# Patient Record
Sex: FEMALE | Race: WHITE | NOT HISPANIC OR LATINO
[De-identification: names, ages, dates, MRNs, and addresses within clinical notes are randomized per-mention and may not be internally consistent; named-entity substitution may affect disease eponyms.]

---

## 2017-12-07 PROBLEM — Z00.00 ENCOUNTER FOR PREVENTIVE HEALTH EXAMINATION: Status: ACTIVE | Noted: 2017-12-07

## 2017-12-08 ENCOUNTER — APPOINTMENT (OUTPATIENT)
Dept: VASCULAR SURGERY | Facility: CLINIC | Age: 67
End: 2017-12-08
Payer: MEDICARE

## 2017-12-08 VITALS
SYSTOLIC BLOOD PRESSURE: 119 MMHG | HEART RATE: 85 BPM | HEIGHT: 60 IN | BODY MASS INDEX: 40.25 KG/M2 | TEMPERATURE: 97.8 F | OXYGEN SATURATION: 93 % | WEIGHT: 205 LBS | DIASTOLIC BLOOD PRESSURE: 84 MMHG

## 2017-12-08 DIAGNOSIS — Z78.9 OTHER SPECIFIED HEALTH STATUS: ICD-10-CM

## 2017-12-08 DIAGNOSIS — Z86.39 PERSONAL HISTORY OF OTHER ENDOCRINE, NUTRITIONAL AND METABOLIC DISEASE: ICD-10-CM

## 2017-12-08 DIAGNOSIS — Z83.511 FAMILY HISTORY OF GLAUCOMA: ICD-10-CM

## 2017-12-08 DIAGNOSIS — Z87.39 PERSONAL HISTORY OF OTHER DISEASES OF THE MUSCULOSKELETAL SYSTEM AND CONNECTIVE TISSUE: ICD-10-CM

## 2017-12-08 DIAGNOSIS — Z82.49 FAMILY HISTORY OF ISCHEMIC HEART DISEASE AND OTHER DISEASES OF THE CIRCULATORY SYSTEM: ICD-10-CM

## 2017-12-08 PROCEDURE — 29580 STRAPPING UNNA BOOT: CPT | Mod: 50

## 2017-12-08 PROCEDURE — 93970 EXTREMITY STUDY: CPT

## 2017-12-08 PROCEDURE — 99204 OFFICE O/P NEW MOD 45 MIN: CPT | Mod: 25

## 2017-12-11 PROBLEM — Z78.9 NON-SMOKER: Status: ACTIVE | Noted: 2017-12-08

## 2017-12-11 PROBLEM — Z82.49 FAMILY HISTORY OF VASCULAR DISORDER: Status: ACTIVE | Noted: 2017-12-08

## 2017-12-11 PROBLEM — Z86.39 HISTORY OF THYROID DISORDER: Status: RESOLVED | Noted: 2017-12-08 | Resolved: 2017-12-11

## 2017-12-11 PROBLEM — Z83.511 FAMILY HISTORY OF GLAUCOMA: Status: ACTIVE | Noted: 2017-12-08

## 2017-12-11 PROBLEM — Z87.39 HISTORY OF ARTHRITIS: Status: RESOLVED | Noted: 2017-12-08 | Resolved: 2017-12-11

## 2017-12-15 ENCOUNTER — APPOINTMENT (OUTPATIENT)
Dept: VASCULAR SURGERY | Facility: CLINIC | Age: 67
End: 2017-12-15
Payer: MEDICARE

## 2017-12-15 VITALS
TEMPERATURE: 98.3 F | WEIGHT: 205 LBS | RESPIRATION RATE: 15 BRPM | HEIGHT: 60 IN | HEART RATE: 77 BPM | DIASTOLIC BLOOD PRESSURE: 80 MMHG | OXYGEN SATURATION: 97 % | SYSTOLIC BLOOD PRESSURE: 121 MMHG | BODY MASS INDEX: 40.25 KG/M2

## 2017-12-15 PROCEDURE — 29580 STRAPPING UNNA BOOT: CPT | Mod: 50

## 2017-12-22 ENCOUNTER — APPOINTMENT (OUTPATIENT)
Dept: VASCULAR SURGERY | Facility: CLINIC | Age: 67
End: 2017-12-22
Payer: MEDICARE

## 2017-12-22 VITALS
HEIGHT: 60 IN | HEART RATE: 82 BPM | SYSTOLIC BLOOD PRESSURE: 118 MMHG | OXYGEN SATURATION: 97 % | RESPIRATION RATE: 15 BRPM | DIASTOLIC BLOOD PRESSURE: 79 MMHG | BODY MASS INDEX: 38.48 KG/M2 | WEIGHT: 196 LBS | TEMPERATURE: 97.9 F

## 2017-12-22 PROCEDURE — 99214 OFFICE O/P EST MOD 30 MIN: CPT | Mod: 25

## 2017-12-22 PROCEDURE — 29580 STRAPPING UNNA BOOT: CPT | Mod: LT

## 2018-01-09 ENCOUNTER — APPOINTMENT (OUTPATIENT)
Dept: VASCULAR SURGERY | Facility: CLINIC | Age: 68
End: 2018-01-09
Payer: MEDICARE

## 2018-01-09 ENCOUNTER — APPOINTMENT (OUTPATIENT)
Dept: CARDIOLOGY | Facility: CLINIC | Age: 68
End: 2018-01-09
Payer: MEDICARE

## 2018-01-09 VITALS
TEMPERATURE: 98.4 F | OXYGEN SATURATION: 96 % | DIASTOLIC BLOOD PRESSURE: 78 MMHG | HEIGHT: 60 IN | HEART RATE: 84 BPM | SYSTOLIC BLOOD PRESSURE: 121 MMHG | RESPIRATION RATE: 15 BRPM

## 2018-01-09 PROCEDURE — 99204 OFFICE O/P NEW MOD 45 MIN: CPT

## 2018-01-09 PROCEDURE — 99213 OFFICE O/P EST LOW 20 MIN: CPT

## 2018-01-09 PROCEDURE — 99214 OFFICE O/P EST MOD 30 MIN: CPT

## 2018-01-09 PROCEDURE — 93000 ELECTROCARDIOGRAM COMPLETE: CPT

## 2018-01-17 ENCOUNTER — MOBILE ON CALL (OUTPATIENT)
Age: 68
End: 2018-01-17

## 2018-02-06 ENCOUNTER — APPOINTMENT (OUTPATIENT)
Dept: VASCULAR SURGERY | Facility: CLINIC | Age: 68
End: 2018-02-06

## 2018-02-16 ENCOUNTER — APPOINTMENT (OUTPATIENT)
Dept: VASCULAR SURGERY | Facility: CLINIC | Age: 68
End: 2018-02-16

## 2018-04-06 ENCOUNTER — APPOINTMENT (OUTPATIENT)
Dept: VASCULAR SURGERY | Facility: CLINIC | Age: 68
End: 2018-04-06

## 2018-04-06 ENCOUNTER — APPOINTMENT (OUTPATIENT)
Dept: VASCULAR SURGERY | Facility: CLINIC | Age: 68
End: 2018-04-06
Payer: MEDICARE

## 2018-04-06 PROCEDURE — 36475 ENDOVENOUS RF 1ST VEIN: CPT | Mod: LT

## 2018-04-10 ENCOUNTER — APPOINTMENT (OUTPATIENT)
Dept: VASCULAR SURGERY | Facility: CLINIC | Age: 68
End: 2018-04-10
Payer: MEDICARE

## 2018-04-10 PROCEDURE — 93971 EXTREMITY STUDY: CPT

## 2018-04-24 ENCOUNTER — APPOINTMENT (OUTPATIENT)
Dept: VASCULAR SURGERY | Facility: CLINIC | Age: 68
End: 2018-04-24
Payer: MEDICARE

## 2018-04-24 VITALS
TEMPERATURE: 98.8 F | WEIGHT: 198 LBS | RESPIRATION RATE: 15 BRPM | BODY MASS INDEX: 38.87 KG/M2 | HEART RATE: 85 BPM | OXYGEN SATURATION: 98 % | SYSTOLIC BLOOD PRESSURE: 117 MMHG | DIASTOLIC BLOOD PRESSURE: 76 MMHG | HEIGHT: 60 IN

## 2018-04-24 PROCEDURE — 99214 OFFICE O/P EST MOD 30 MIN: CPT

## 2018-05-07 ENCOUNTER — RECORD ABSTRACTING (OUTPATIENT)
Age: 68
End: 2018-05-07

## 2018-05-07 DIAGNOSIS — Z82.49 FAMILY HISTORY OF ISCHEMIC HEART DISEASE AND OTHER DISEASES OF THE CIRCULATORY SYSTEM: ICD-10-CM

## 2018-05-08 ENCOUNTER — APPOINTMENT (OUTPATIENT)
Dept: CARDIOLOGY | Facility: CLINIC | Age: 68
End: 2018-05-08
Payer: MEDICARE

## 2018-05-08 VITALS
BODY MASS INDEX: 38.28 KG/M2 | SYSTOLIC BLOOD PRESSURE: 120 MMHG | RESPIRATION RATE: 16 BRPM | HEART RATE: 68 BPM | DIASTOLIC BLOOD PRESSURE: 72 MMHG | WEIGHT: 195 LBS | HEIGHT: 60 IN

## 2018-05-08 PROCEDURE — 93000 ELECTROCARDIOGRAM COMPLETE: CPT

## 2018-05-08 PROCEDURE — 99214 OFFICE O/P EST MOD 30 MIN: CPT

## 2018-05-08 RX ORDER — CETIRIZINE HYDROCHLORIDE 10 MG/1
10 CAPSULE, LIQUID FILLED ORAL
Refills: 0 | Status: ACTIVE | COMMUNITY

## 2018-05-08 RX ORDER — ATORVASTATIN CALCIUM 10 MG/1
10 TABLET, FILM COATED ORAL
Qty: 90 | Refills: 0 | Status: ACTIVE | COMMUNITY
Start: 2017-06-12

## 2018-05-08 RX ORDER — CHOLECALCIFEROL (VITAMIN D3) 50 MCG
50 MCG TABLET ORAL
Refills: 0 | Status: ACTIVE | COMMUNITY

## 2018-05-08 RX ORDER — CLOBETASOL PROPIONATE 0.05 G/100ML
0.05 LOTION TOPICAL TWICE DAILY
Qty: 60 | Refills: 3 | Status: DISCONTINUED | COMMUNITY
Start: 2017-12-15 | End: 2018-05-08

## 2018-05-08 RX ORDER — METFORMIN ER 500 MG 500 MG/1
500 TABLET ORAL
Refills: 0 | Status: ACTIVE | COMMUNITY
Start: 2017-06-12

## 2018-08-07 ENCOUNTER — APPOINTMENT (OUTPATIENT)
Dept: VASCULAR SURGERY | Facility: CLINIC | Age: 68
End: 2018-08-07
Payer: MEDICARE

## 2018-08-07 VITALS
OXYGEN SATURATION: 97 % | SYSTOLIC BLOOD PRESSURE: 115 MMHG | DIASTOLIC BLOOD PRESSURE: 74 MMHG | BODY MASS INDEX: 39.26 KG/M2 | WEIGHT: 201 LBS | HEART RATE: 77 BPM | TEMPERATURE: 98.5 F

## 2018-08-07 DIAGNOSIS — Z98.890 OTHER SPECIFIED POSTPROCEDURAL STATES: ICD-10-CM

## 2018-08-07 DIAGNOSIS — I83.12 VARICOSE VEINS OF LEFT LOWER EXTREMITY WITH INFLAMMATION: ICD-10-CM

## 2018-08-07 PROCEDURE — 99213 OFFICE O/P EST LOW 20 MIN: CPT

## 2018-08-08 PROBLEM — Z98.890 STATUS POST ENDOVENOUS RADIOFREQUENCY ABLATION (RFA) OF SAPHENOUS VEIN: Status: ACTIVE | Noted: 2018-04-24

## 2018-08-08 PROBLEM — I83.12 VARICOSE VEINS OF LEFT LOWER EXTREMITY WITH INFLAMMATION: Status: ACTIVE | Noted: 2017-12-11

## 2018-09-04 ENCOUNTER — RESULT REVIEW (OUTPATIENT)
Age: 68
End: 2018-09-04

## 2018-09-10 ENCOUNTER — APPOINTMENT (OUTPATIENT)
Dept: SURGERY | Facility: CLINIC | Age: 68
End: 2018-09-10
Payer: MEDICARE

## 2018-09-10 PROCEDURE — 99204K: CUSTOM

## 2018-09-17 ENCOUNTER — APPOINTMENT (OUTPATIENT)
Dept: ULTRASOUND IMAGING | Facility: IMAGING CENTER | Age: 68
End: 2018-09-17
Payer: MEDICARE

## 2018-09-17 ENCOUNTER — OUTPATIENT (OUTPATIENT)
Dept: OUTPATIENT SERVICES | Facility: HOSPITAL | Age: 68
LOS: 1 days | End: 2018-09-17
Payer: MEDICARE

## 2018-09-17 VITALS
RESPIRATION RATE: 16 BRPM | TEMPERATURE: 98 F | DIASTOLIC BLOOD PRESSURE: 70 MMHG | WEIGHT: 199.96 LBS | HEIGHT: 60 IN | HEART RATE: 66 BPM | SYSTOLIC BLOOD PRESSURE: 120 MMHG

## 2018-09-17 DIAGNOSIS — H26.9 UNSPECIFIED CATARACT: Chronic | ICD-10-CM

## 2018-09-17 DIAGNOSIS — E83.52 HYPERCALCEMIA: Chronic | ICD-10-CM

## 2018-09-17 DIAGNOSIS — R92.8 OTHER ABNORMAL AND INCONCLUSIVE FINDINGS ON DIAGNOSTIC IMAGING OF BREAST: ICD-10-CM

## 2018-09-17 DIAGNOSIS — N63.20 UNSPECIFIED LUMP IN THE LEFT BREAST, UNSPECIFIED QUADRANT: Chronic | ICD-10-CM

## 2018-09-17 DIAGNOSIS — N63.10 UNSPECIFIED LUMP IN THE RIGHT BREAST, UNSPECIFIED QUADRANT: ICD-10-CM

## 2018-09-17 DIAGNOSIS — R06.83 SNORING: ICD-10-CM

## 2018-09-17 DIAGNOSIS — M12.9 ARTHROPATHY, UNSPECIFIED: Chronic | ICD-10-CM

## 2018-09-17 LAB
ALBUMIN SERPL ELPH-MCNC: 4.6 G/DL — SIGNIFICANT CHANGE UP (ref 3.3–5)
ALP SERPL-CCNC: 53 U/L — SIGNIFICANT CHANGE UP (ref 40–120)
ALT FLD-CCNC: 19 U/L — SIGNIFICANT CHANGE UP (ref 4–33)
AST SERPL-CCNC: 14 U/L — SIGNIFICANT CHANGE UP (ref 4–32)
BILIRUB SERPL-MCNC: < 0.2 MG/DL — LOW (ref 0.2–1.2)
BUN SERPL-MCNC: 27 MG/DL — HIGH (ref 7–23)
CALCIUM SERPL-MCNC: 9.8 MG/DL — SIGNIFICANT CHANGE UP (ref 8.4–10.5)
CHLORIDE SERPL-SCNC: 100 MMOL/L — SIGNIFICANT CHANGE UP (ref 98–107)
CO2 SERPL-SCNC: 24 MMOL/L — SIGNIFICANT CHANGE UP (ref 22–31)
CREAT SERPL-MCNC: 1.08 MG/DL — SIGNIFICANT CHANGE UP (ref 0.5–1.3)
GLUCOSE SERPL-MCNC: 108 MG/DL — HIGH (ref 70–99)
HBA1C BLD-MCNC: 6.8 % — HIGH (ref 4–5.6)
HCT VFR BLD CALC: 39.1 % — SIGNIFICANT CHANGE UP (ref 34.5–45)
HGB BLD-MCNC: 12.8 G/DL — SIGNIFICANT CHANGE UP (ref 11.5–15.5)
MCHC RBC-ENTMCNC: 31.1 PG — SIGNIFICANT CHANGE UP (ref 27–34)
MCHC RBC-ENTMCNC: 32.7 % — SIGNIFICANT CHANGE UP (ref 32–36)
MCV RBC AUTO: 94.9 FL — SIGNIFICANT CHANGE UP (ref 80–100)
NRBC # FLD: 0 — SIGNIFICANT CHANGE UP
PLATELET # BLD AUTO: 212 K/UL — SIGNIFICANT CHANGE UP (ref 150–400)
PMV BLD: 11.4 FL — SIGNIFICANT CHANGE UP (ref 7–13)
POTASSIUM SERPL-MCNC: 5.7 MMOL/L — HIGH (ref 3.5–5.3)
POTASSIUM SERPL-SCNC: 5.7 MMOL/L — HIGH (ref 3.5–5.3)
PROT SERPL-MCNC: 7.3 G/DL — SIGNIFICANT CHANGE UP (ref 6–8.3)
RBC # BLD: 4.12 M/UL — SIGNIFICANT CHANGE UP (ref 3.8–5.2)
RBC # FLD: 11.8 % — SIGNIFICANT CHANGE UP (ref 10.3–14.5)
SODIUM SERPL-SCNC: 137 MMOL/L — SIGNIFICANT CHANGE UP (ref 135–145)
WBC # BLD: 10.47 K/UL — SIGNIFICANT CHANGE UP (ref 3.8–10.5)
WBC # FLD AUTO: 10.47 K/UL — SIGNIFICANT CHANGE UP (ref 3.8–10.5)

## 2018-09-17 PROCEDURE — 19285 PERQ DEV BREAST 1ST US IMAG: CPT | Mod: RT

## 2018-09-17 PROCEDURE — C1739: CPT

## 2018-09-17 PROCEDURE — 19285 PERQ DEV BREAST 1ST US IMAG: CPT

## 2018-09-17 PROCEDURE — 93010 ELECTROCARDIOGRAM REPORT: CPT

## 2018-09-17 RX ORDER — SODIUM CHLORIDE 9 MG/ML
1000 INJECTION, SOLUTION INTRAVENOUS
Qty: 0 | Refills: 0 | Status: DISCONTINUED | OUTPATIENT
Start: 2018-09-25 | End: 2018-10-10

## 2018-09-17 NOTE — H&P PST ADULT - ENDOCRINE COMMENTS
diabetes diagnosed in 2013; denies thyroid disease diabetes diagnosed in 2013, doesn't do finger sticks; denies thyroid disease

## 2018-09-17 NOTE — H&P PST ADULT - LYMPHATIC
anterior cervical R/posterior cervical L/supraclavicular L/anterior cervical L/supraclavicular R/posterior cervical R

## 2018-09-17 NOTE — H&P PST ADULT - PSH
Arthropathy  right knee replacement in 2014  Breast mass, left  10 years ago in 2009  Cataract  b/l cataract lenses  Hypercalcemia  parathyroidectomy in 2008

## 2018-09-17 NOTE — H&P PST ADULT - PMH
Diabetes  type II, diagnosed in 2013  Hypercholesterolemia    Hypertension    Lymphedema  b/l leg lymphedema  Obesity    Snoring  NAY precautions -- responds affirmatively to STOP BANG questionnaire

## 2018-09-17 NOTE — H&P PST ADULT - HISTORY OF PRESENT ILLNESS
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 Subsequent mammo and biopsy revealed pathology. Scheduled for right breast biopsy with seed localization on 9-25-18

## 2018-09-17 NOTE — H&P PST ADULT - PROBLEM SELECTOR PLAN 1
This is a 67 y/o female who is scheduled for right breast biopsy with seed localization on 9-25-18 This is a 67 y/o female who is scheduled for right breast biopsy with seed localization on 9-25-18  * Last aspirin on 9-16-18  * Instructed to take normal am dose of lisinopril, chlorthalidone, zyrtec the am of surgery

## 2018-09-17 NOTE — H&P PST ADULT - NSANTHOSAYNRD_GEN_A_CORE
No. ANY screening performed.  STOP BANG Legend: 0-2 = LOW Risk; 3-4 = INTERMEDIATE Risk; 5-8 = HIGH Risk

## 2018-09-18 ENCOUNTER — APPOINTMENT (OUTPATIENT)
Dept: CARDIOLOGY | Facility: CLINIC | Age: 68
End: 2018-09-18
Payer: MEDICARE

## 2018-09-18 VITALS
SYSTOLIC BLOOD PRESSURE: 115 MMHG | OXYGEN SATURATION: 96 % | RESPIRATION RATE: 16 BRPM | HEART RATE: 76 BPM | HEIGHT: 60 IN | BODY MASS INDEX: 39.46 KG/M2 | WEIGHT: 201 LBS | DIASTOLIC BLOOD PRESSURE: 76 MMHG

## 2018-09-18 PROCEDURE — 93000 ELECTROCARDIOGRAM COMPLETE: CPT

## 2018-09-18 PROCEDURE — 99213 OFFICE O/P EST LOW 20 MIN: CPT

## 2018-09-18 RX ORDER — OMEPRAZOLE 20 MG/1
20 CAPSULE, DELAYED RELEASE ORAL DAILY
Refills: 0 | Status: DISCONTINUED | COMMUNITY
Start: 2016-12-12 | End: 2018-09-18

## 2018-09-20 PROBLEM — E66.9 OBESITY, UNSPECIFIED: Chronic | Status: ACTIVE | Noted: 2018-09-17

## 2018-09-20 PROBLEM — R06.83 SNORING: Chronic | Status: ACTIVE | Noted: 2018-09-17

## 2018-09-20 PROBLEM — I89.0 LYMPHEDEMA, NOT ELSEWHERE CLASSIFIED: Chronic | Status: ACTIVE | Noted: 2018-09-17

## 2018-09-20 PROBLEM — E78.00 PURE HYPERCHOLESTEROLEMIA, UNSPECIFIED: Chronic | Status: ACTIVE | Noted: 2018-09-17

## 2018-09-20 PROBLEM — E11.9 TYPE 2 DIABETES MELLITUS WITHOUT COMPLICATIONS: Chronic | Status: ACTIVE | Noted: 2018-09-17

## 2018-09-20 PROBLEM — I10 ESSENTIAL (PRIMARY) HYPERTENSION: Chronic | Status: ACTIVE | Noted: 2018-09-17

## 2018-09-25 ENCOUNTER — OUTPATIENT (OUTPATIENT)
Dept: OUTPATIENT SERVICES | Facility: HOSPITAL | Age: 68
LOS: 1 days | Discharge: HOME | End: 2018-09-25
Payer: MEDICARE

## 2018-09-25 ENCOUNTER — RESULT REVIEW (OUTPATIENT)
Age: 68
End: 2018-09-25

## 2018-09-25 ENCOUNTER — APPOINTMENT (OUTPATIENT)
Dept: SURGERY | Facility: HOSPITAL | Age: 68
End: 2018-09-25

## 2018-09-25 VITALS
SYSTOLIC BLOOD PRESSURE: 98 MMHG | OXYGEN SATURATION: 97 % | HEART RATE: 58 BPM | RESPIRATION RATE: 16 BRPM | DIASTOLIC BLOOD PRESSURE: 50 MMHG

## 2018-09-25 VITALS
RESPIRATION RATE: 16 BRPM | SYSTOLIC BLOOD PRESSURE: 122 MMHG | HEIGHT: 60 IN | TEMPERATURE: 98 F | HEART RATE: 74 BPM | OXYGEN SATURATION: 97 % | WEIGHT: 199.96 LBS | DIASTOLIC BLOOD PRESSURE: 58 MMHG

## 2018-09-25 DIAGNOSIS — E83.52 HYPERCALCEMIA: Chronic | ICD-10-CM

## 2018-09-25 DIAGNOSIS — M12.9 ARTHROPATHY, UNSPECIFIED: Chronic | ICD-10-CM

## 2018-09-25 DIAGNOSIS — N63.20 UNSPECIFIED LUMP IN THE LEFT BREAST, UNSPECIFIED QUADRANT: Chronic | ICD-10-CM

## 2018-09-25 DIAGNOSIS — R92.8 OTHER ABNORMAL AND INCONCLUSIVE FINDINGS ON DIAGNOSTIC IMAGING OF BREAST: ICD-10-CM

## 2018-09-25 DIAGNOSIS — H26.9 UNSPECIFIED CATARACT: Chronic | ICD-10-CM

## 2018-09-25 LAB
GLUCOSE BLDV-MCNC: 136 — HIGH (ref 70–99)
POTASSIUM BLDV-SCNC: 4.5 MMOL/L — SIGNIFICANT CHANGE UP (ref 3.4–4.5)

## 2018-09-25 PROCEDURE — 19125K: CUSTOM | Mod: RT

## 2018-09-25 PROCEDURE — 76098 X-RAY EXAM SURGICAL SPECIMEN: CPT | Mod: 26

## 2018-09-25 PROCEDURE — 88305 TISSUE EXAM BY PATHOLOGIST: CPT | Mod: 26

## 2018-09-25 RX ORDER — CETIRIZINE HYDROCHLORIDE 10 MG/1
1 TABLET ORAL
Qty: 0 | Refills: 0 | COMMUNITY

## 2018-09-25 RX ORDER — CHOLECALCIFEROL (VITAMIN D3) 125 MCG
1 CAPSULE ORAL
Qty: 0 | Refills: 0 | COMMUNITY

## 2018-09-25 RX ORDER — ATORVASTATIN CALCIUM 80 MG/1
1 TABLET, FILM COATED ORAL
Qty: 0 | Refills: 0 | COMMUNITY

## 2018-09-25 RX ORDER — ACETAMINOPHEN 500 MG
2 TABLET ORAL
Qty: 0 | Refills: 0 | COMMUNITY

## 2018-09-25 RX ORDER — ASPIRIN/CALCIUM CARB/MAGNESIUM 324 MG
1 TABLET ORAL
Qty: 0 | Refills: 0 | COMMUNITY

## 2018-09-25 RX ORDER — CHLORTHALIDONE 50 MG
1 TABLET ORAL
Qty: 0 | Refills: 0 | COMMUNITY

## 2018-09-25 RX ORDER — METFORMIN HYDROCHLORIDE 850 MG/1
1 TABLET ORAL
Qty: 0 | Refills: 0 | COMMUNITY

## 2018-09-25 RX ORDER — LISINOPRIL 2.5 MG/1
1 TABLET ORAL
Qty: 0 | Refills: 0 | COMMUNITY

## 2018-09-25 NOTE — ASU DISCHARGE PLAN (ADULT/PEDIATRIC). - NURSING INSTRUCTIONS
No heavy lifting or straining. Keep right breast dressing D & I. Dressing may be removed in 24 hours. Follow Dr Mack's discharge instruction sheet.

## 2018-09-25 NOTE — ASU DISCHARGE PLAN (ADULT/PEDIATRIC). - MEDICATION SUMMARY - MEDICATIONS TO TAKE
I will START or STAY ON the medications listed below when I get home from the hospital:    Tylenol 325 mg oral tablet  -- 2 tab(s) by mouth every 4 hours, As Needed for post op pain   -- Indication: For post op pain     aspirin 81 mg oral tablet  -- 1 tab(s) by mouth once a day  -- Indication: For home medication     lisinopril 40 mg oral tablet  -- 1 tab(s) by mouth once a day  -- Indication: For HTN    metFORMIN 500 mg oral tablet  -- 1 tab(s) by mouth 2 times a day  -- Indication: For DM    ZyrTEC 10 mg oral tablet  -- 1 tab(s) by mouth once a day (at bedtime)  -- Indication: For allergies    atorvastatin 10 mg oral tablet  -- 1 tab(s) by mouth once a day (at bedtime)  -- Indication: For HLD    chlorthalidone 25 mg oral tablet  -- 1 tab(s) by mouth once a day  -- Indication: For HTN    Vitamin D3 2000 intl units oral tablet  -- 1 tab(s) by mouth once a day  -- Indication: For supplement

## 2018-09-25 NOTE — ASU DISCHARGE PLAN (ADULT/PEDIATRIC). - CONDITIONS AT DISCHARGE
The PCP placed an order for an Open Access Colonoscopy Screening. This is a recall, last performed on  5/16/08  by Dr. Hogue   Please contact the patient for scheduling per your recall protocol.          stable stable No heavy lifting or straining. If any bleeding, swelling or redness notify MD. Pt mp PO offering no c/o pain. Right breast dressing D & >

## 2018-09-27 LAB — SURGICAL PATHOLOGY STUDY: SIGNIFICANT CHANGE UP

## 2018-10-08 ENCOUNTER — APPOINTMENT (OUTPATIENT)
Dept: SURGERY | Facility: CLINIC | Age: 68
End: 2018-10-08
Payer: MEDICARE

## 2018-10-08 PROCEDURE — 99024 POSTOP FOLLOW-UP VISIT: CPT

## 2019-03-11 ENCOUNTER — APPOINTMENT (OUTPATIENT)
Dept: CARDIOLOGY | Facility: CLINIC | Age: 69
End: 2019-03-11
Payer: MEDICARE

## 2019-03-11 ENCOUNTER — NON-APPOINTMENT (OUTPATIENT)
Age: 69
End: 2019-03-11

## 2019-03-11 VITALS
DIASTOLIC BLOOD PRESSURE: 76 MMHG | BODY MASS INDEX: 38.68 KG/M2 | OXYGEN SATURATION: 97 % | HEART RATE: 72 BPM | HEIGHT: 60 IN | SYSTOLIC BLOOD PRESSURE: 123 MMHG | WEIGHT: 197 LBS | RESPIRATION RATE: 15 BRPM

## 2019-03-11 PROCEDURE — 99214 OFFICE O/P EST MOD 30 MIN: CPT

## 2019-03-11 PROCEDURE — 93000 ELECTROCARDIOGRAM COMPLETE: CPT

## 2019-03-11 NOTE — REVIEW OF SYSTEMS
[FreeTextEntry1] : Patient denies headache. Other than noted above full review of systems is unremarkable.

## 2019-03-11 NOTE — HISTORY OF PRESENT ILLNESS
[FreeTextEntry1] : Patient returns to office today feeling generally well and about the same as last I saw her. Her edema remains controlled with the use of stockings. She reports blood pressures at home in the 110s over 70s. Unfortunately he has not lost significant weight although she is trying to make some changes and lost a few pounds. Patient denies chest pain, shortness of breath, palpitations, orthopnea, presyncope, syncope.

## 2019-03-11 NOTE — PHYSICAL EXAM
[General Appearance - In No Acute Distress] : no acute distress [Normal Conjunctiva] : the conjunctiva exhibited no abnormalities [Normal Oral Mucosa] : normal oral mucosa [Auscultation Breath Sounds / Voice Sounds] : lungs were clear to auscultation bilaterally [Heart Sounds] : normal S1 and S2 [Abdomen Soft] : soft [Abdomen Tenderness] : non-tender [Abnormal Walk] : normal gait [Nail Clubbing] : no clubbing of the fingernails [Cyanosis, Localized] : no localized cyanosis [Skin Color & Pigmentation] : normal skin color and pigmentation [Oriented To Time, Place, And Person] : oriented to person, place, and time [Affect] : the affect was normal [FreeTextEntry1] : B/l lymphedema improved

## 2019-03-11 NOTE — DISCUSSION/SUMMARY
[FreeTextEntry1] : 1. Continue current cardiac meds in doses as noted above for hypertension.\par 2. Follow up with primary doctor for treatment of diabetes.\par 3. Continue use of stockings and leg edema pump for her edema. Followup with vascular.\par 4. Patient encouraged to work on diet to lose weight.\par 5. Patient is encouraged to exercise at least 30 minutes a day everyday of the week.\par 6. Follow up here in six months.

## 2019-03-11 NOTE — ASSESSMENT
[FreeTextEntry1] : EKG: Sinus rhythm with no significant ST or T wave changes.\par \par 68-year-old female with a past medical history of hypertension, diabetes, chronic lymphedema, obesity who presents for followup. Patient continues to do well from a cardiovascular standpoint with good control of her blood pressure and her edema. Unfortunately she still has a lot of issues with weight and has not lost any significant weight but is continuing to make attempts to change this fact. Certainly if she can start exercising that would help but she needs to get on a more strict dietary plan.

## 2019-08-26 ENCOUNTER — APPOINTMENT (OUTPATIENT)
Dept: CARDIOLOGY | Facility: CLINIC | Age: 69
End: 2019-08-26

## 2019-12-17 ENCOUNTER — OUTPATIENT (OUTPATIENT)
Dept: OUTPATIENT SERVICES | Facility: HOSPITAL | Age: 69
LOS: 1 days | End: 2019-12-17

## 2019-12-17 DIAGNOSIS — H26.9 UNSPECIFIED CATARACT: Chronic | ICD-10-CM

## 2019-12-17 DIAGNOSIS — N63.20 UNSPECIFIED LUMP IN THE LEFT BREAST, UNSPECIFIED QUADRANT: Chronic | ICD-10-CM

## 2019-12-17 DIAGNOSIS — E83.52 HYPERCALCEMIA: Chronic | ICD-10-CM

## 2019-12-17 DIAGNOSIS — M12.9 ARTHROPATHY, UNSPECIFIED: Chronic | ICD-10-CM

## 2019-12-26 ENCOUNTER — APPOINTMENT (OUTPATIENT)
Dept: CARDIOLOGY | Facility: CLINIC | Age: 69
End: 2019-12-26
Payer: MEDICARE

## 2019-12-26 VITALS
DIASTOLIC BLOOD PRESSURE: 73 MMHG | HEART RATE: 81 BPM | WEIGHT: 192 LBS | RESPIRATION RATE: 15 BRPM | BODY MASS INDEX: 37.69 KG/M2 | OXYGEN SATURATION: 95 % | SYSTOLIC BLOOD PRESSURE: 126 MMHG | HEIGHT: 60 IN

## 2019-12-26 PROCEDURE — 99214 OFFICE O/P EST MOD 30 MIN: CPT

## 2019-12-26 RX ORDER — MONTELUKAST 10 MG/1
10 TABLET, FILM COATED ORAL
Refills: 0 | Status: ACTIVE | COMMUNITY

## 2019-12-26 NOTE — PHYSICAL EXAM
[General Appearance - In No Acute Distress] : no acute distress [Normal Conjunctiva] : the conjunctiva exhibited no abnormalities [Normal Oral Mucosa] : normal oral mucosa [Auscultation Breath Sounds / Voice Sounds] : lungs were clear to auscultation bilaterally [Heart Sounds] : normal S1 and S2 [Abdomen Soft] : soft [Abdomen Tenderness] : non-tender [Abnormal Walk] : normal gait [Nail Clubbing] : no clubbing of the fingernails [Cyanosis, Localized] : no localized cyanosis [Skin Color & Pigmentation] : normal skin color and pigmentation [Oriented To Time, Place, And Person] : oriented to person, place, and time [Affect] : the affect was normal [FreeTextEntry1] : B/l lymphedema R>L

## 2019-12-26 NOTE — DISCUSSION/SUMMARY
[FreeTextEntry1] : 1. Proceed with surgery as planned.\par 2. Monitor through the procedure until stable post procedurally.\par 3. Continue current cardiac meds in doses as noted above for hypertension.\par 4. Follow up with primary doctor for treatment of diabetes.\par 5. Continue use of stockings and leg edema pump for her edema. Followup with vascular.\par 6. Patient encouraged to work on diet to lose weight.\par 7. Patient is encouraged to exercise at least 30 minutes a day everyday of the week.\par 8. No additional cardiac testing at this time. Echocardiogram prior to followup.\par 9. Follow up here in six months.

## 2019-12-26 NOTE — HISTORY OF PRESENT ILLNESS
[FreeTextEntry1] : Patient returns to office today planning a left knee replacement. She had a right knee replacement without issue several years ago but the left knee now needs to be done. She otherwise has been feeling very well. 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 Her edema has been very stable. She reports no shortness of breath at any time.  Patient denies chest pain, palpitations, orthopnea, presyncope, syncope.

## 2019-12-26 NOTE — ASSESSMENT
[FreeTextEntry1] : EKG: Sinus rhythm with no significant ST or T wave changes at presurgical testing December 17, 2019.\par \par 69-year-old female with a past medical history of hypertension, diabetes, chronic lymphedema, obesity who presents for followup. Patient is planning left knee replacement in a few weeks. She has been stable from a cardiac standpoint for some time. Her edema remains controlled with stockings. She does well above 4 METs regularly despite her knee pains without issue. EKG is unremarkable. Blood pressure is controlled. She represents a low risk with no contraindication to surgery at this time.

## 2020-01-09 ENCOUNTER — INPATIENT (INPATIENT)
Facility: HOSPITAL | Age: 70
LOS: 1 days | Discharge: NURSING, REHAB OR LONG-TERM CARE | End: 2020-01-11
Payer: MEDICARE

## 2020-01-09 ENCOUNTER — OUTPATIENT (OUTPATIENT)
Dept: OUTPATIENT SERVICES | Facility: HOSPITAL | Age: 70
LOS: 1 days | End: 2020-01-09

## 2020-01-09 DIAGNOSIS — H26.9 UNSPECIFIED CATARACT: Chronic | ICD-10-CM

## 2020-01-09 DIAGNOSIS — M12.9 ARTHROPATHY, UNSPECIFIED: Chronic | ICD-10-CM

## 2020-01-09 DIAGNOSIS — N63.20 UNSPECIFIED LUMP IN THE LEFT BREAST, UNSPECIFIED QUADRANT: Chronic | ICD-10-CM

## 2020-01-09 DIAGNOSIS — E83.52 HYPERCALCEMIA: Chronic | ICD-10-CM

## 2020-01-09 PROCEDURE — 73560 X-RAY EXAM OF KNEE 1 OR 2: CPT | Mod: 26,LT

## 2020-01-10 ENCOUNTER — OUTPATIENT (OUTPATIENT)
Dept: OUTPATIENT SERVICES | Facility: HOSPITAL | Age: 70
LOS: 1 days | End: 2020-01-10

## 2020-01-10 DIAGNOSIS — E83.52 HYPERCALCEMIA: Chronic | ICD-10-CM

## 2020-01-10 DIAGNOSIS — H26.9 UNSPECIFIED CATARACT: Chronic | ICD-10-CM

## 2020-01-10 DIAGNOSIS — N63.20 UNSPECIFIED LUMP IN THE LEFT BREAST, UNSPECIFIED QUADRANT: Chronic | ICD-10-CM

## 2020-01-10 DIAGNOSIS — M12.9 ARTHROPATHY, UNSPECIFIED: Chronic | ICD-10-CM

## 2020-01-10 PROCEDURE — 76770 US EXAM ABDO BACK WALL COMP: CPT | Mod: 26

## 2020-01-10 PROCEDURE — 76856 US EXAM PELVIC COMPLETE: CPT | Mod: 26

## 2020-01-11 ENCOUNTER — OUTPATIENT (OUTPATIENT)
Dept: OUTPATIENT SERVICES | Facility: HOSPITAL | Age: 70
LOS: 1 days | End: 2020-01-11

## 2020-01-11 ENCOUNTER — INPATIENT (INPATIENT)
Facility: HOSPITAL | Age: 70
LOS: 8 days | Discharge: HOME | End: 2020-01-20
Attending: INTERNAL MEDICINE | Admitting: INTERNAL MEDICINE

## 2020-01-11 DIAGNOSIS — N63.20 UNSPECIFIED LUMP IN THE LEFT BREAST, UNSPECIFIED QUADRANT: Chronic | ICD-10-CM

## 2020-01-11 DIAGNOSIS — H26.9 UNSPECIFIED CATARACT: Chronic | ICD-10-CM

## 2020-01-11 DIAGNOSIS — M12.9 ARTHROPATHY, UNSPECIFIED: Chronic | ICD-10-CM

## 2020-01-11 DIAGNOSIS — E83.52 HYPERCALCEMIA: Chronic | ICD-10-CM

## 2020-01-12 ENCOUNTER — OUTPATIENT (OUTPATIENT)
Dept: OUTPATIENT SERVICES | Facility: HOSPITAL | Age: 70
LOS: 1 days | End: 2020-01-12

## 2020-01-12 DIAGNOSIS — H26.9 UNSPECIFIED CATARACT: Chronic | ICD-10-CM

## 2020-01-12 DIAGNOSIS — N63.20 UNSPECIFIED LUMP IN THE LEFT BREAST, UNSPECIFIED QUADRANT: Chronic | ICD-10-CM

## 2020-01-12 DIAGNOSIS — M12.9 ARTHROPATHY, UNSPECIFIED: Chronic | ICD-10-CM

## 2020-01-12 DIAGNOSIS — E83.52 HYPERCALCEMIA: Chronic | ICD-10-CM

## 2020-01-13 ENCOUNTER — OUTPATIENT (OUTPATIENT)
Dept: OUTPATIENT SERVICES | Facility: HOSPITAL | Age: 70
LOS: 1 days | End: 2020-01-13

## 2020-01-13 DIAGNOSIS — E83.52 HYPERCALCEMIA: Chronic | ICD-10-CM

## 2020-01-13 DIAGNOSIS — N63.20 UNSPECIFIED LUMP IN THE LEFT BREAST, UNSPECIFIED QUADRANT: Chronic | ICD-10-CM

## 2020-01-13 DIAGNOSIS — M12.9 ARTHROPATHY, UNSPECIFIED: Chronic | ICD-10-CM

## 2020-01-13 DIAGNOSIS — H26.9 UNSPECIFIED CATARACT: Chronic | ICD-10-CM

## 2020-01-16 ENCOUNTER — OUTPATIENT (OUTPATIENT)
Dept: OUTPATIENT SERVICES | Facility: HOSPITAL | Age: 70
LOS: 1 days | End: 2020-01-16

## 2020-01-16 DIAGNOSIS — H26.9 UNSPECIFIED CATARACT: Chronic | ICD-10-CM

## 2020-01-16 DIAGNOSIS — M12.9 ARTHROPATHY, UNSPECIFIED: Chronic | ICD-10-CM

## 2020-01-16 DIAGNOSIS — E83.52 HYPERCALCEMIA: Chronic | ICD-10-CM

## 2020-01-16 DIAGNOSIS — N63.20 UNSPECIFIED LUMP IN THE LEFT BREAST, UNSPECIFIED QUADRANT: Chronic | ICD-10-CM

## 2020-01-18 ENCOUNTER — OUTPATIENT (OUTPATIENT)
Dept: OUTPATIENT SERVICES | Facility: HOSPITAL | Age: 70
LOS: 1 days | End: 2020-01-18

## 2020-01-18 DIAGNOSIS — H26.9 UNSPECIFIED CATARACT: Chronic | ICD-10-CM

## 2020-01-18 DIAGNOSIS — N63.20 UNSPECIFIED LUMP IN THE LEFT BREAST, UNSPECIFIED QUADRANT: Chronic | ICD-10-CM

## 2020-01-18 DIAGNOSIS — E83.52 HYPERCALCEMIA: Chronic | ICD-10-CM

## 2020-01-18 DIAGNOSIS — M12.9 ARTHROPATHY, UNSPECIFIED: Chronic | ICD-10-CM

## 2020-08-04 ENCOUNTER — APPOINTMENT (OUTPATIENT)
Dept: CARDIOLOGY | Facility: CLINIC | Age: 70
End: 2020-08-04
Payer: MEDICARE

## 2020-08-04 PROCEDURE — 93306 TTE W/DOPPLER COMPLETE: CPT

## 2020-08-10 ENCOUNTER — APPOINTMENT (OUTPATIENT)
Dept: CARDIOLOGY | Facility: CLINIC | Age: 70
End: 2020-08-10
Payer: MEDICARE

## 2020-08-10 ENCOUNTER — NON-APPOINTMENT (OUTPATIENT)
Age: 70
End: 2020-08-10

## 2020-08-10 VITALS
TEMPERATURE: 98.2 F | HEART RATE: 81 BPM | OXYGEN SATURATION: 95 % | WEIGHT: 192 LBS | HEIGHT: 60 IN | SYSTOLIC BLOOD PRESSURE: 123 MMHG | RESPIRATION RATE: 16 BRPM | DIASTOLIC BLOOD PRESSURE: 73 MMHG | BODY MASS INDEX: 37.69 KG/M2

## 2020-08-10 PROCEDURE — 99214 OFFICE O/P EST MOD 30 MIN: CPT

## 2020-08-10 PROCEDURE — 93000 ELECTROCARDIOGRAM COMPLETE: CPT

## 2020-08-10 RX ORDER — FLUTICASONE PROPIONATE 50 UG/1
50 SPRAY, METERED NASAL
Qty: 16 | Refills: 0 | Status: ACTIVE | COMMUNITY
Start: 2019-10-24

## 2020-08-10 NOTE — HISTORY OF PRESENT ILLNESS
[FreeTextEntry1] : Patient comes back to the office today feeling generally well although noting that she is tired in the afternoon a lot. She notices this more since January. She did have her knee surgery successfully around that time without any problems. Her swelling has been under control with the use of her stockings. She reports no other symptoms at this time. Patient denies chest pain, shortness of breath, palpitations, orthopnea, presyncope, syncope.

## 2020-08-10 NOTE — PHYSICAL EXAM
[General Appearance - In No Acute Distress] : no acute distress [Normal Conjunctiva] : the conjunctiva exhibited no abnormalities [Normal Oral Mucosa] : normal oral mucosa [Auscultation Breath Sounds / Voice Sounds] : lungs were clear to auscultation bilaterally [Heart Sounds] : normal S1 and S2 [Abdomen Tenderness] : non-tender [Abdomen Soft] : soft [Abnormal Walk] : normal gait [Nail Clubbing] : no clubbing of the fingernails [Cyanosis, Localized] : no localized cyanosis [Skin Color & Pigmentation] : normal skin color and pigmentation [Oriented To Time, Place, And Person] : oriented to person, place, and time [Affect] : the affect was normal [FreeTextEntry1] : B/l lymphedema R>L

## 2020-08-10 NOTE — DISCUSSION/SUMMARY
[FreeTextEntry1] : 1. Continue current cardiac meds in doses as noted above for hypertension.\par 2. Follow up with primary doctor for treatment of diabetes.\par 3. Continue use of stockings and leg edema pump for her edema. Followup with vascular.\par 4. Patient encouraged to work on diet to lose weight.\par 5. Patient is encouraged to exercise at least 30 minutes a day everyday of the week.\par 6. No additional cardiac testing at this time. Plan stress testing prior to followup to reevaluate given her diabetes and risk factors.\par 7. Follow up here in six months.

## 2020-08-10 NOTE — ASSESSMENT
[FreeTextEntry1] : Echocardiogram August 4, 2020 demonstrated left ventricle normal size and function with ejection fraction of 55-60%. Trace mitral and pulmonic regurgitation. No other structural abnormalities.\par \par EKG: Sinus rhythm with no significant ST or T wave changes. Late R wave transition.\par \par 69-year-old female with a past medical history of hypertension, diabetes, chronic lymphedema, obesity who presents for followup. Patient appears to be doing well from a cardiac standpoint. Echocardiogram shows a normal EF and no other significant abnormalities. Blood pressure is controlled as is her edema. It has been a number of years since her last stress testing given her risk factors including diabetes I do recommend considering repeating the test at followup. Certainly I continued to encourage her with regards to weight loss and doing more exercise at home.

## 2021-02-01 ENCOUNTER — APPOINTMENT (OUTPATIENT)
Dept: CARDIOLOGY | Facility: CLINIC | Age: 71
End: 2021-02-01

## 2021-03-17 ENCOUNTER — APPOINTMENT (OUTPATIENT)
Dept: CARDIOLOGY | Facility: CLINIC | Age: 71
End: 2021-03-17
Payer: MEDICARE

## 2021-03-17 DIAGNOSIS — R94.31 ABNORMAL ELECTROCARDIOGRAM [ECG] [EKG]: ICD-10-CM

## 2021-03-17 PROCEDURE — A9500: CPT

## 2021-03-17 PROCEDURE — 78452 HT MUSCLE IMAGE SPECT MULT: CPT

## 2021-03-17 PROCEDURE — 93015 CV STRESS TEST SUPVJ I&R: CPT

## 2021-03-17 RX ORDER — REGADENOSON 0.08 MG/ML
0.4 INJECTION, SOLUTION INTRAVENOUS
Qty: 4 | Refills: 0 | Status: COMPLETED | OUTPATIENT
Start: 2021-03-17

## 2021-03-17 RX ADMIN — REGADENOSON 0 MG/5ML: 0.08 INJECTION, SOLUTION INTRAVENOUS at 00:00

## 2021-03-18 ENCOUNTER — APPOINTMENT (OUTPATIENT)
Dept: CARDIOLOGY | Facility: CLINIC | Age: 71
End: 2021-03-18

## 2021-03-23 ENCOUNTER — APPOINTMENT (OUTPATIENT)
Dept: CARDIOLOGY | Facility: CLINIC | Age: 71
End: 2021-03-23
Payer: MEDICARE

## 2021-03-23 VITALS
SYSTOLIC BLOOD PRESSURE: 120 MMHG | BODY MASS INDEX: 38.28 KG/M2 | WEIGHT: 195 LBS | RESPIRATION RATE: 16 BRPM | DIASTOLIC BLOOD PRESSURE: 76 MMHG | HEART RATE: 83 BPM | HEIGHT: 60 IN | TEMPERATURE: 98 F

## 2021-03-23 PROBLEM — R94.31 ABNORMAL EKG: Status: ACTIVE | Noted: 2021-03-23

## 2021-03-23 PROCEDURE — 99214 OFFICE O/P EST MOD 30 MIN: CPT

## 2021-03-23 RX ORDER — KIT FOR THE PREPARATION OF TECHNETIUM TC99M SESTAMIBI 1 MG/5ML
INJECTION, POWDER, LYOPHILIZED, FOR SOLUTION PARENTERAL
Refills: 0 | Status: COMPLETED | OUTPATIENT
Start: 2021-03-23

## 2021-03-23 RX ADMIN — KIT FOR THE PREPARATION OF TECHNETIUM TC99M SESTAMIBI 0: 1 INJECTION, POWDER, LYOPHILIZED, FOR SOLUTION PARENTERAL at 00:00

## 2021-03-23 NOTE — DISCUSSION/SUMMARY
[FreeTextEntry1] : 1. Continue current cardiac meds in doses as noted above for hypertension.\par 2. Monitor BP at home, keep a log and bring to f/u\par 3. Follow up with primary doctor for treatment of diabetes.\par 4. Continue use of stockings and leg edema pump for her edema. Followup with vascular.\par 5. Patient encouraged to work on diet to lose weight.\par 6. Patient is encouraged to exercise at least 30 minutes a day everyday of the week.\par 7. No additional cardiac testing at this time. Plan carotid Doppler to reevaluate her carotid disease prior to follow-up.\par 8. Follow up here in six months.

## 2021-03-23 NOTE — ASSESSMENT
[FreeTextEntry1] : Echocardiogram August 4, 2020 demonstrated left ventricle normal size and function with ejection fraction of 55-60%. Trace mitral and pulmonic regurgitation. No other structural abnormalities.\par \par Nuclear stress test March 17, 2021 was a pharmacologic study which was tolerated well.  Blood pressure response was normal.  EKG showed no evidence of ischemia.  Evaluation of nuclear imaging showed no evidence of ischemia or infarct and ejection fraction of 70%.\par \par 70-year-old female with a past medical history of hypertension, diabetes, chronic lymphedema, obesity who presents for followup. Patient appears to be doing well from a cardiac standpoint.  Stress testing shows no evidence of ischemia or infarct and a normal ejection fraction.  Patient remains asymptomatic.  Edema continues to be controlled.  Lipids are very well controlled.  Her A1c is somewhat borderline but not unreasonably controlled.  I have encouraged her to continue with her exercise and work on diet and weight loss.

## 2021-03-23 NOTE — HISTORY OF PRESENT ILLNESS
[FreeTextEntry1] : Patient presents back today feeling very well and offering no complaints.  She has begun exercising over the last few weeks with some walking and feels very good when she does it.  She hopes to do more.  She is tolerating all of her medications without a problem.  Her edema is controlled.  She has now had both of her knee replacements and is feeling well with that also.  Patient denies chest pain, shortness of breath, palpitations, orthopnea, presyncope, syncope.

## 2021-08-03 ENCOUNTER — APPOINTMENT (OUTPATIENT)
Dept: CARDIOLOGY | Facility: CLINIC | Age: 71
End: 2021-08-03
Payer: MEDICARE

## 2021-08-03 PROCEDURE — 93880 EXTRACRANIAL BILAT STUDY: CPT

## 2021-08-19 ENCOUNTER — APPOINTMENT (OUTPATIENT)
Dept: CARDIOLOGY | Facility: CLINIC | Age: 71
End: 2021-08-19
Payer: MEDICARE

## 2021-08-19 ENCOUNTER — NON-APPOINTMENT (OUTPATIENT)
Age: 71
End: 2021-08-19

## 2021-08-19 VITALS
SYSTOLIC BLOOD PRESSURE: 115 MMHG | DIASTOLIC BLOOD PRESSURE: 74 MMHG | BODY MASS INDEX: 38.48 KG/M2 | RESPIRATION RATE: 16 BRPM | HEART RATE: 86 BPM | OXYGEN SATURATION: 92 % | HEIGHT: 60 IN | WEIGHT: 196 LBS

## 2021-08-19 DIAGNOSIS — R60.0 LOCALIZED EDEMA: ICD-10-CM

## 2021-08-19 PROCEDURE — 99214 OFFICE O/P EST MOD 30 MIN: CPT

## 2021-08-19 PROCEDURE — 93000 ELECTROCARDIOGRAM COMPLETE: CPT

## 2021-08-19 NOTE — ASSESSMENT
[FreeTextEntry1] : Echocardiogram August 4, 2020 demonstrated left ventricle normal size and function with ejection fraction of 55-60%. Trace mitral and pulmonic regurgitation. No other structural abnormalities.\par \par Nuclear stress test March 17, 2021 was a pharmacologic study which was tolerated well.  Blood pressure response was normal.  EKG showed no evidence of ischemia.  Evaluation of nuclear imaging showed no evidence of ischemia or infarct and ejection fraction of 70%.\par \par Carotid Doppler August 3, 2021 showed minimal plaque on the left, mild on the right. Mild stenosis on the right with no significant stenosis on the left.\par \par EKG: Sinus rhythm with no significant ST or T wave changes.\par \par 70-year-old female with a past medical history of hypertension, diabetes, chronic lymphedema, obesity who presents for followup. Patient appears to be doing well from a cardiac standpoint. Carotid Doppler shows very minimal disease at this time. She continues on statin therapy and I will make no changes for now as her lipids are very well controlled. Her blood pressure is also very well controlled. She needs to continue work on improving her diabetes. I have once again encouraged her with diet, exercise and weight loss. Her edema is also well controlled with the use of stockings.

## 2021-08-19 NOTE — DISCUSSION/SUMMARY
[FreeTextEntry1] : 1. Continue current cardiac meds in doses as noted above for hypertension.\par 2. Monitor BP at home, keep a log and bring to f/u\par 3. Follow up with primary doctor for treatment of diabetes.\par 4. Continue use of stockings and leg edema pump for her edema. Followup with vascular.\par 5. Patient encouraged to work on diet to lose weight.\par 6. Patient is encouraged to exercise at least 30 minutes a day everyday of the week.\par 7. No additional cardiac testing at this time.\par 8. Follow up here in six months.

## 2021-08-19 NOTE — HISTORY OF PRESENT ILLNESS
[FreeTextEntry1] : Patient presents back today feeling very well and offering no complaints. Her edema has been stable. Blood pressures have been in the 110s to 120s over 60s to 70s. She is tolerating her medication without a problem. Her A1c continues to be a little elevated but she is working on her diet in hopes that it will come down. Patient denies chest pain, shortness of breath, palpitations, orthopnea, presyncope, syncope

## 2022-01-19 ENCOUNTER — NON-APPOINTMENT (OUTPATIENT)
Age: 72
End: 2022-01-19

## 2022-01-19 ENCOUNTER — APPOINTMENT (OUTPATIENT)
Dept: CARDIOLOGY | Facility: CLINIC | Age: 72
End: 2022-01-19
Payer: MEDICARE

## 2022-01-19 VITALS
SYSTOLIC BLOOD PRESSURE: 120 MMHG | HEIGHT: 60 IN | BODY MASS INDEX: 38.09 KG/M2 | DIASTOLIC BLOOD PRESSURE: 78 MMHG | HEART RATE: 71 BPM | RESPIRATION RATE: 16 BRPM | OXYGEN SATURATION: 95 % | WEIGHT: 194 LBS

## 2022-01-19 PROCEDURE — 93000 ELECTROCARDIOGRAM COMPLETE: CPT

## 2022-01-19 PROCEDURE — 99214 OFFICE O/P EST MOD 30 MIN: CPT

## 2022-01-19 RX ORDER — METFORMIN HYDROCHLORIDE 500 MG/1
500 TABLET, COATED ORAL
Qty: 180 | Refills: 0 | Status: DISCONTINUED | COMMUNITY
Start: 2020-06-08 | End: 2022-01-19

## 2022-01-19 RX ORDER — ASPIRIN 81 MG/1
81 TABLET ORAL DAILY
Refills: 0 | Status: DISCONTINUED | COMMUNITY
End: 2022-01-19

## 2022-01-19 RX ORDER — IBUPROFEN 200 MG
600 CAPSULE ORAL
Refills: 0 | Status: ACTIVE | COMMUNITY

## 2022-01-19 RX ORDER — TOBRAMYCIN AND DEXAMETHASONE 3; 1 MG/ML; MG/ML
0.3-0.1 SUSPENSION/ DROPS OPHTHALMIC
Qty: 10 | Refills: 0 | Status: DISCONTINUED | COMMUNITY
Start: 2020-04-15 | End: 2022-01-19

## 2022-01-19 NOTE — HISTORY OF PRESENT ILLNESS
[FreeTextEntry1] : Patient presents to the office today feeling very well and offering no complaints.  She has not lost any weight and admits to not really doing any exercise but remains active.  She reports no symptoms within her daily activities.  Blood pressure in the 110s to 120s over 60s to 70s.  She is tolerating her medication without a problem.  Her edema has been reasonably controlled.  Patient denies chest pain, shortness of breath, palpitations, orthopnea, presyncope, syncope.

## 2022-01-19 NOTE — ASSESSMENT
[FreeTextEntry1] : Echocardiogram August 4, 2020 demonstrated left ventricle normal size and function with ejection fraction of 55-60%. Trace mitral and pulmonic regurgitation. No other structural abnormalities.\par \par Nuclear stress test March 17, 2021 was a pharmacologic study which was tolerated well.  Blood pressure response was normal.  EKG showed no evidence of ischemia.  Evaluation of nuclear imaging showed no evidence of ischemia or infarct and ejection fraction of 70%.\par \par Carotid Doppler August 3, 2021 showed minimal plaque on the left, mild on the right. Mild stenosis on the right with no significant stenosis on the left.\par \par EKG: Sinus rhythm with no significant ST or T wave changes.\par \par 70-year-old female with a past medical history of hypertension, diabetes, chronic lymphedema, obesity who presents for followup. Patient appears to be doing well from a cardiac standpoint.  Patient certainly again needs to work on her diet and exercise as well as weight loss.  Blood pressure remains very well controlled.  She is on a good regimen and is tolerating it without a problem.  Her edema continues to be reasonably controlled with elevation and stockings.

## 2022-01-19 NOTE — DISCUSSION/SUMMARY
[FreeTextEntry1] : 1. Continue current cardiac meds in doses as noted above for hypertension.\par 2. Monitor BP at home, keep a log and bring to f/u\par 3. Follow up with primary doctor for treatment of diabetes.\par 4. Continue use of stockings and leg edema pump for her edema. \par 5. Patient encouraged to work on diet to lose weight.\par 6. Patient is encouraged to exercise at least 30 minutes a day everyday of the week.\par 7. No additional cardiac testing at this time.\par 8. Follow up here in one year or as needed.

## 2023-01-05 ENCOUNTER — NON-APPOINTMENT (OUTPATIENT)
Age: 73
End: 2023-01-05

## 2023-01-05 ENCOUNTER — APPOINTMENT (OUTPATIENT)
Dept: CARDIOLOGY | Facility: CLINIC | Age: 73
End: 2023-01-05
Payer: MEDICARE

## 2023-01-05 VITALS
BODY MASS INDEX: 34.95 KG/M2 | SYSTOLIC BLOOD PRESSURE: 141 MMHG | HEART RATE: 70 BPM | WEIGHT: 178 LBS | DIASTOLIC BLOOD PRESSURE: 83 MMHG | OXYGEN SATURATION: 97 % | RESPIRATION RATE: 16 BRPM | HEIGHT: 60 IN

## 2023-01-05 DIAGNOSIS — I89.0 LYMPHEDEMA, NOT ELSEWHERE CLASSIFIED: ICD-10-CM

## 2023-01-05 PROCEDURE — 99214 OFFICE O/P EST MOD 30 MIN: CPT

## 2023-01-05 PROCEDURE — 93000 ELECTROCARDIOGRAM COMPLETE: CPT

## 2023-01-05 RX ORDER — LISINOPRIL 40 MG/1
40 TABLET ORAL DAILY
Qty: 90 | Refills: 0 | Status: DISCONTINUED | COMMUNITY
Start: 2017-06-12 | End: 2023-01-05

## 2023-01-05 RX ORDER — SITAGLIPTIN 100 MG/1
100 TABLET, FILM COATED ORAL
Refills: 0 | Status: DISCONTINUED | COMMUNITY
End: 2023-01-05

## 2023-01-05 RX ORDER — EMPAGLIFLOZIN 25 MG/1
TABLET, FILM COATED ORAL
Refills: 0 | Status: ACTIVE | COMMUNITY

## 2023-01-05 RX ORDER — CHLORTHALIDONE 25 MG/1
25 TABLET ORAL DAILY
Qty: 90 | Refills: 0 | Status: DISCONTINUED | COMMUNITY
Start: 2017-06-12 | End: 2023-01-05

## 2023-01-05 NOTE — HISTORY OF PRESENT ILLNESS
[FreeTextEntry1] : Patient presents back today doing well.  She had apparently some issues with her renal function and followed up with nephrology who stopped her lisinopril and chlorthalidone.  Since then her blood pressures have remained about the same.  Blood pressures have been in the 110s to 120s over 70s.  Of note she did lose about 15 pounds by changing her diet and is trying to be active.  Her edema has also remained well controlled with the use of stockings.  Patient denies chest pain, shortness of breath, palpitations, orthopnea, presyncope, syncope.

## 2023-01-05 NOTE — ASSESSMENT
[FreeTextEntry1] : Echocardiogram August 4, 2020 demonstrated left ventricle normal size and function with ejection fraction of 55-60%. Trace mitral and pulmonic regurgitation. No other structural abnormalities.\par \par Nuclear stress test March 17, 2021 was a pharmacologic study which was tolerated well.  Blood pressure response was normal.  EKG showed no evidence of ischemia.  Evaluation of nuclear imaging showed no evidence of ischemia or infarct and ejection fraction of 70%.\par \par Carotid Doppler August 3, 2021 showed minimal plaque on the left, mild on the right. Mild stenosis on the right with no significant stenosis on the left.\par \par EKG: Sinus rhythm with no significant ST or T wave changes.\par \par 70-year-old female with a past medical history of hypertension, diabetes, chronic lymphedema, obesity who presents for followup. Patient presents back today doing very well.  Her lisinopril and chlorthalidone were stopped and her blood pressure did not increase at all as she also lost about 15 pounds.  At this time there is no need to restart the medication although I would consider the ACE inhibitor given her diabetes and some chronic kidney disease.  She will discuss this with her nephrologist.  Her potassium when last checked was a little on the high side and that may be a reason not to start it but it should be considered.  Other blood work shows good control of her lipids and reasonable control of her diabetes.  I have encouraged her to continue efforts with diet and exercise as and weight loss.  EKG is unremarkable.

## 2023-01-05 NOTE — DISCUSSION/SUMMARY
[FreeTextEntry1] : 1. Continue off all meds for hypertension at this time.  Discussed with nephrology whether she should be on an ACE inhibitor given her diabetes and kidney disease.\par 2. Continue atorvastatin for dyslipidemia in the setting of diabetes.\par 3. Monitor BP at home, keep a log and bring to f/u\par 4. Follow up with primary doctor for treatment of diabetes.  Continue current regimen.\par 5. Continue use of stockings and leg edema pump for her edema. \par 6. Patient encouraged to work on diet to lose weight.\par 7. Patient is encouraged to exercise at least 30 minutes a day everyday of the week.\par 8. No additional cardiac testing at this time.\par 9. Follow up here in one year or as needed. [EKG obtained to assist in diagnosis and management of assessed problem(s)] : EKG obtained to assist in diagnosis and management of assessed problem(s)

## 2023-09-15 ASSESSMENT — KOOS JR
KOOS JR RAW SCORE: 6
HOW SEVERE IS YOUR KNEE STIFFNESS AFTER FIRST WAKING IN MORNING: MODERATE
IMPORTED KOOS JR SCORE: 70.7
STRAIGHTENING KNEE FULLY: MODERATE
IMPORTED KOOS JR SCORE: 50.01
IMPORTED KOOS JR SCORE: 70.7
KOOS JR RAW SCORE: 4
IMPORTED FORM: YES
IMPORTED FORM: YES
IMPORTED LATERALITY: LEFT
KOOS JR RAW SCORE: 6
TWISING OR PIVOTING ON KNEE: MILD
GOING UP OR DOWN STAIRS: MODERATE
KOOS JR RAW SCORE: 4
KOOS JR RAW SCORE: 15
KOOS JR RAW SCORE: 15
BENDING TO THE FLOOR TO PICK UP OBJECT: MODERATE
TWISING OR PIVOTING ON KNEE: MODERATE
STANDING UPRIGHT: MILD
RISING FROM SITTING: MILD
IMPORTED KOOS JR SCORE: 61.58
TWISING OR PIVOTING ON KNEE: MODERATE
RISING FROM SITTING: MILD
HOW SEVERE IS YOUR KNEE STIFFNESS AFTER FIRST WAKING IN MORNING: MILD
TWISING OR PIVOTING ON KNEE: MILD
GOING UP OR DOWN STAIRS: MODERATE
STANDING UPRIGHT: MODERATE
BENDING TO THE FLOOR TO PICK UP OBJECT: SEVERE
IMPORTED KOOS JR SCORE: 76.33
STRAIGHTENING KNEE FULLY: MODERATE
IMPORTED KOOS JR SCORE: 76.33
BENDING TO THE FLOOR TO PICK UP OBJECT: MODERATE
KOOS JR RAW SCORE: 10
HOW SEVERE IS YOUR KNEE STIFFNESS AFTER FIRST WAKING IN MORNING: MODERATE
IMPORTED KOOS JR SCORE: 61.58
BENDING TO THE FLOOR TO PICK UP OBJECT: SEVERE
IMPORTED LATERALITY: LEFT
STANDING UPRIGHT: MILD
HOW SEVERE IS YOUR KNEE STIFFNESS AFTER FIRST WAKING IN MORNING: MILD
KOOS JR RAW SCORE: 10
GOING UP OR DOWN STAIRS: MILD
RISING FROM SITTING: MODERATE
STANDING UPRIGHT: MODERATE
GOING UP OR DOWN STAIRS: MILD
IMPORTED KOOS JR SCORE: 50.01
RISING FROM SITTING: MODERATE

## 2023-10-20 ENCOUNTER — OFFICE (OUTPATIENT)
Dept: URBAN - METROPOLITAN AREA CLINIC 104 | Facility: CLINIC | Age: 73
Setting detail: OPHTHALMOLOGY
End: 2023-10-20
Payer: MEDICARE

## 2023-10-20 DIAGNOSIS — H18.519: ICD-10-CM

## 2023-10-20 DIAGNOSIS — H01.001: ICD-10-CM

## 2023-10-20 DIAGNOSIS — H01.005: ICD-10-CM

## 2023-10-20 DIAGNOSIS — H01.002: ICD-10-CM

## 2023-10-20 DIAGNOSIS — H11.823: ICD-10-CM

## 2023-10-20 DIAGNOSIS — H16.223: ICD-10-CM

## 2023-10-20 DIAGNOSIS — Z79.84: ICD-10-CM

## 2023-10-20 DIAGNOSIS — E11.9: ICD-10-CM

## 2023-10-20 DIAGNOSIS — H01.004: ICD-10-CM

## 2023-10-20 DIAGNOSIS — Z96.1: ICD-10-CM

## 2023-10-20 DIAGNOSIS — H26.493: ICD-10-CM

## 2023-10-20 DIAGNOSIS — H43.813: ICD-10-CM

## 2023-10-20 PROCEDURE — 92015 DETERMINE REFRACTIVE STATE: CPT | Performed by: OPHTHALMOLOGY

## 2023-10-20 PROCEDURE — 92286 ANT SGM IMG I&R SPECLR MIC: CPT | Performed by: OPHTHALMOLOGY

## 2023-10-20 PROCEDURE — 92014 COMPRE OPH EXAM EST PT 1/>: CPT | Performed by: OPHTHALMOLOGY

## 2023-10-20 ASSESSMENT — REFRACTION_MANIFEST
OS_VA1: 20/25
OS_ADD: +2.50
OS_AXIS: 090
OD_VA2: 20/20-
OS_CYLINDER: -1.00
OD_ADD: +2.50
OS_VA2: 20/20-
OD_AXIS: 105
OD_SPHERE: +1.00
OD_CYLINDER: -0.50
OS_SPHERE: +0.75
OD_VA1: 20/25+2

## 2023-10-20 ASSESSMENT — REFRACTION_AUTOREFRACTION
OS_AXIS: 078
OD_AXIS: 110
OS_SPHERE: +1.25
OD_CYLINDER: -1.25
OS_CYLINDER: -2.00
OD_SPHERE: +1.00

## 2023-10-20 ASSESSMENT — REFRACTION_CURRENTRX
OS_AXIS: 089
OS_CYLINDER: -1.00
OS_SPHERE: +0.75
OD_SPHERE: +1.00
OD_OVR_VA: 20/
OD_CYLINDER: -1.00
OD_ADD: +2.25
OS_ADD: +2.25
OS_OVR_VA: 20/
OD_AXIS: 099

## 2023-10-20 ASSESSMENT — CORNEAL DYSTROPHY - POSTERIOR
OS_POSTERIORDYSTROPHY: 3+ GUTTATA
OD_POSTERIORDYSTROPHY: 3+ GUTTATA

## 2023-10-20 ASSESSMENT — AXIALLENGTH_DERIVED
OD_AL: 22.6231
OD_AL: 22.7583
OS_AL: 22.84
OS_AL: 22.84

## 2023-10-20 ASSESSMENT — TEAR BREAK UP TIME (TBUT)
OD_TBUT: 2+
OS_TBUT: 2+

## 2023-10-20 ASSESSMENT — TONOMETRY
OS_IOP_MMHG: 16
OD_IOP_MMHG: 16

## 2023-10-20 ASSESSMENT — SPHEQUIV_DERIVED
OD_SPHEQUIV: 0.375
OS_SPHEQUIV: 0.25
OD_SPHEQUIV: 0.75
OS_SPHEQUIV: 0.25

## 2023-10-20 ASSESSMENT — LID EXAM ASSESSMENTS
OS_BLEPHARITIS: LLL LUL 2+
OD_BLEPHARITIS: RLL RUL 2+

## 2023-10-20 ASSESSMENT — CONFRONTATIONAL VISUAL FIELD TEST (CVF)
OD_FINDINGS: FULL
OS_FINDINGS: FULL

## 2023-10-20 ASSESSMENT — KERATOMETRY
OD_AXISANGLE_DEGREES: 031
OS_AXISANGLE_DEGREES: 168
OS_K2POWER_DIOPTERS: 45.92
OD_K1POWER_DIOPTERS: 45.30
OD_K2POWER_DIOPTERS: 45.61
OS_K1POWER_DIOPTERS: 44.76

## 2023-10-20 ASSESSMENT — VISUAL ACUITY
OS_BCVA: 20/25
OD_BCVA: 20/25

## 2024-01-25 NOTE — H&P PST ADULT - RESPIRATORY
Subjective:       Patient ID: Anitra Fermin is a 77 y.o. female.    Chief Complaint: Hospital Follow Up    Anitra Fermin presents to the clinic today for an ER follow up from 1/18/2024    Chief Complaint  Patient presents with  · Allergic Reaction  · Itching      EMS reports pt was complaining of itching and redness to skin. EMS reports that pt had taken a Benadryl prior to their arrival. Pt refused IV access for first responders and for EMS. Pt denies taking any new meds. Pt reports itching and redness to underarms also.      Pt with hx of RA here via EMS for eval itchy rash onset today. No new meds or known exposures. No SOB but EMS says pt's RA sats in the 80s. 95% on 2L. Pt denies hx of COPD or CHF. She has not had any recent illness nor taken any abx the past few weeks. She took benadryl pta and her itching has resolved. No pain. She says her BP was elevated when EMS checked it. She denies hx of HTN. She says she had RSV and shingles vaccines 2days ago.   Allergic Reaction  The primary symptoms are  rash and urticaria. The primary symptoms do not include wheezing, shortness of breath, cough, abdominal pain, nausea, vomiting, diarrhea, dizziness, palpitations, altered mental status or angioedema. The current episode started today. The problem has not changed since onset.  The rash began today. The rash appears on the right arm, left arm, back, torso, face, groin and head. The rash is associated with itching. The rash is not associated with blisters or weeping.   Significant symptoms also include itching. Significant symptoms that are not present include eye redness, flushing or rhinorrhea.  Medications  dexAMETHasone sodium phos (PF) injection 10 mg (10 mg Intravenous Given 1/18/24 1926)  albuterol-ipratropium 2.5 mg-0.5 mg/3 mL nebulizer solution 3 mL (3 mLs Nebulization Given 1/18/24 2028)  ketorolac injection 15 mg (15 mg Intravenous Given 1/18/24 2112)  traMADoL tablet 50 mg (50 mg Oral Given 1/18/24 2112)      Medical Decision Making  Pt presented with urticarial rash of unknown etiology. She also had transient hypoxia but clear lungs on exam. She was given decadron and a neb. Her rash improved and her O2 requirement resolved. Pt was never sob. CBC, CMP,  Mg, BNP unremarkable. Covid and flu neg. Pt advised to make appt with her doctor in the next few days. Return precautions discussed     ED Prescriptions     predniSONE (DELTASONE) 20 MG tablet Take 3 tablets (60 mg total) by mouth once daily. for 5 days 15 tablet 1/18/2024 1/23/2024 Tomy Monae Jr., MD    albuterol (PROVENTIL/VENTOLIN HFA) 90 mcg/actuation inhaler Inhale 1-2 puffs into the lungs every 4 (four) hours as needed for Wheezing or Shortness of Breath. Rescue 8 g 1/18/2024 1/17/2025 Tomy Monae Jr., MD        Recently moved from [facility] to assist her sister who she reports is now on Hospice  Has known meat allergy- reports this normally comes in the form of pruritic wide spread hives.  Rash that brought her to the ER was in the form of small red bumps- concerned it might have been more anxiety related. Was prescribed Hydroxyzine 10 mg by her previous PCP prior to her move and is currently out     History of thyroid disease- reports she was on levothyroxine 75 mcg for years and then all of a sudden she reports she was told she did not need the medication any longer- has not had in > 6 months    Was folding clothing yesterday and initially she thought her sister's cat scratched her right hand, but thinks that she may have been bitten instead. Washed area with antibacterial soap and water. Denies any bleeding/drainage from the area.  Does reports swelling to the hand, redness, warmth and discomfort           Review of Systems    There is no problem list on file for this patient.      Objective:      Physical Exam  Constitutional:       General: She is not in acute distress.     Appearance: Normal appearance.   Cardiovascular:       Rate and Rhythm: Normal rate and regular rhythm.      Heart sounds: Normal heart sounds.   Pulmonary:      Effort: Pulmonary effort is normal. No respiratory distress.      Breath sounds: Normal breath sounds. No wheezing.   Musculoskeletal:      Right hand: Deformity present.      Left hand: Deformity present.   Skin:     General: Skin is warm and dry.      Findings: Erythema and laceration present.          Neurological:      Mental Status: She is alert and oriented to person, place, and time.   Psychiatric:         Mood and Affect: Mood normal.         Behavior: Behavior normal.         Lab Results   Component Value Date    WBC 6.70 01/18/2024    HGB 12.0 01/18/2024    HCT 38.4 01/18/2024     01/18/2024    ALT 7 (L) 01/18/2024    AST 14 01/18/2024     01/18/2024    K 4.1 01/18/2024     01/18/2024    CREATININE 0.8 01/18/2024    BUN 20 01/18/2024    CO2 24 01/18/2024     The ASCVD Risk score (Shad DK, et al., 2019) failed to calculate for the following reasons:    Cannot find a previous HDL lab    Cannot find a previous total cholesterol lab  Visit Vitals  BP (!) 140/80 (BP Location: Right arm, Patient Position: Sitting, BP Method: Medium (Manual))   Pulse 70   Wt 59.4 kg (130 lb 14.4 oz)   SpO2 (!) 94%   BMI 22.47 kg/m²      Assessment:       1. Encounter for medical examination to establish care    2. Situational anxiety    3. Screening for colon cancer    4. Hypothyroidism, unspecified type    5. History of anemia    6. Chronic interstitial lung disease    7. Urticaria due to food allergy    8. Elevated blood pressure reading in office without diagnosis of hypertension    9. Encounter for lipid screening for cardiovascular disease    10. Cat bite, initial encounter        Plan:       1. Encounter for medical examination to establish care  Obtain pending blood work for review  Provided copy of [place] Immunization Record.   2. Situational anxiety  -     hydrOXYzine HCL (ATARAX) 10 MG Tab;  Take 1 tablet (10 mg total) by mouth 3 (three) times daily as needed (anxiety).  Dispense: 60 tablet; Refill: 1  Tolerated medication well- refill provided.   3. Screening for colon cancer  -     Cologuard Screening (Multitarget Stool DNA); Future; Expected date: 01/25/2024    4. Hypothyroidism, unspecified type  -     TSH; Future; Expected date: 01/25/2024  -     T3, free; Future; Expected date: 01/25/2024  -     T4, free; Future; Expected date: 01/25/2024    5. History of anemia  -     Iron and TIBC; Future; Expected date: 01/25/2024  -     Ferritin; Future; Expected date: 01/25/2024    6. Chronic interstitial lung disease  Comments:  XR 1/18/2024    7. Urticaria due to food allergy  -     EPINEPHrine (EPIPEN) 0.3 mg/0.3 mL AtIn; Inject 0.3 mLs (0.3 mg total) into the muscle once. for 1 dose  Dispense: 0.3 mL; Refill: 0    8. Elevated blood pressure reading in office without diagnosis of hypertension  The patient was counseled on HTN education, management and recommendations. The need for weight reduction was reinforced and a BMI goal of 19 to 25 was set.  Patient was encouraged to adhere to a low sodium diet and a DASH diet was recommended. Patient was also encouraged to engage in routine exercise such as walking most days of the week greater than 30 minutes. Patient education materials were provided to the patient for home review and further reinforcement of topics discussed.     Please monitor your blood pressure twice a day.  Make sure you have not had any caffeine or tobacco with in 45 minutes of checking your blood pressure.  Keep a log to bring to your next office visit.     9. Encounter for lipid screening for cardiovascular disease  -     Lipid Panel; Future; Expected date: 01/25/2024    10. Cat bite, initial encounter  -     amoxicillin-clavulanate 875-125mg (AUGMENTIN) 875-125 mg per tablet; Take 1 tablet by mouth 2 (two) times daily. for 5 days  Dispense: 10 tablet; Refill: 0  Take medication as  prescribed with food until all gone  Wash area with antibacterial soap and water daily- pat dry  Apply ointment as prescribed  Notify office of any worsening/monitor for infection     Follow up in about 8 weeks (around 3/21/2024).      Future Appointments       Date Provider Specialty Appt Notes    1/25/2024  Lab LAB    3/8/2024 Garima Gonzalez NP Family Medicine 6 Week Follow Up              detailed exam

## 2024-05-21 ENCOUNTER — APPOINTMENT (OUTPATIENT)
Dept: CARDIOLOGY | Facility: CLINIC | Age: 74
End: 2024-05-21
Payer: MEDICARE

## 2024-05-21 VITALS
WEIGHT: 176 LBS | HEIGHT: 60 IN | DIASTOLIC BLOOD PRESSURE: 80 MMHG | HEART RATE: 73 BPM | BODY MASS INDEX: 34.55 KG/M2 | SYSTOLIC BLOOD PRESSURE: 129 MMHG | RESPIRATION RATE: 16 BRPM

## 2024-05-21 DIAGNOSIS — E78.5 HYPERLIPIDEMIA, UNSPECIFIED: ICD-10-CM

## 2024-05-21 DIAGNOSIS — E66.9 OBESITY, UNSPECIFIED: ICD-10-CM

## 2024-05-21 DIAGNOSIS — I65.29 OCCLUSION AND STENOSIS OF UNSPECIFIED CAROTID ARTERY: ICD-10-CM

## 2024-05-21 DIAGNOSIS — E11.9 TYPE 2 DIABETES MELLITUS W/OUT COMPLICATIONS: ICD-10-CM

## 2024-05-21 DIAGNOSIS — I10 ESSENTIAL (PRIMARY) HYPERTENSION: ICD-10-CM

## 2024-05-21 PROCEDURE — 99214 OFFICE O/P EST MOD 30 MIN: CPT

## 2024-05-21 PROCEDURE — G2211 COMPLEX E/M VISIT ADD ON: CPT

## 2024-05-21 PROCEDURE — 93000 ELECTROCARDIOGRAM COMPLETE: CPT

## 2024-05-21 NOTE — ASSESSMENT
[FreeTextEntry1] : Echocardiogram August 4, 2020 demonstrated left ventricle normal size and function with ejection fraction of 55-60%. Trace mitral and pulmonic regurgitation. No other structural abnormalities.  Nuclear stress test March 17, 2021 was a pharmacologic study which was tolerated well.  Blood pressure response was normal.  EKG showed no evidence of ischemia.  Evaluation of nuclear imaging showed no evidence of ischemia or infarct and ejection fraction of 70%.  Carotid Doppler August 3, 2021 showed minimal plaque on the left, mild on the right. Mild stenosis on the right with no significant stenosis on the left.  EKG: Sinus rhythm with no significant ST or T wave changes.  70-year-old female with a past medical history of hypertension, diabetes, chronic lymphedema, obesity who presents for followup. Patient presents back today doing very well.  She remains off all medication for hypertension.  Recent blood work shows good control of her lipids on low-dose atorvastatin.  She discussed with the nephrologist and ultimately the decision was made not to place her back on an ACE inhibitor given her prior renal issues.  Her diabetes is mostly controlled although her A1c is slightly high and she is following with her primary for that.  Edema is very well-controlled.  EKG today is unremarkable.

## 2024-05-21 NOTE — DISCUSSION/SUMMARY
[FreeTextEntry1] : 1. Continue off all meds for hypertension at this time.  Per her nephrologist will not restart ACEi. 2. Continue atorvastatin 10mg QHS for dyslipidemia in the setting of diabetes. 3. Monitor BP at home, keep a log and bring to f/u 4. Follow up with primary doctor for treatment of diabetes.  Continue current regimen. 5. Continue use of stockings for her edema.  6. Patient encouraged to work on diet to lose weight. 7. Patient is encouraged to exercise at least 30 minutes a day everyday of the week. 8. Check carotid Doppler to reevaluate known prior carotid disease. 9. Follow up here in one year or as needed. [EKG obtained to assist in diagnosis and management of assessed problem(s)] : EKG obtained to assist in diagnosis and management of assessed problem(s)

## 2024-05-21 NOTE — HISTORY OF PRESENT ILLNESS
[FreeTextEntry1] : Patient presents back to the office today feeling very well.  She continues off all medications for hypertension and talk to her nephrologist who did not feel she needed to be on an ACE inhibitor.  Blood pressures have been in the 110s to 120s over 60s to 70s.  Her edema is been well-controlled with just the use of stockings.  She has not needed the lymphedema pump.  She is able to do all normal daily activities without symptoms or limitations.  Patient denies chest pain, shortness of breath, palpitations, orthopnea, presyncope, syncope.

## 2024-06-25 ENCOUNTER — APPOINTMENT (OUTPATIENT)
Dept: CARDIOLOGY | Facility: CLINIC | Age: 74
End: 2024-06-25
Payer: MEDICARE

## 2024-06-25 PROCEDURE — 93880 EXTRACRANIAL BILAT STUDY: CPT

## 2024-08-26 LAB — HBA1C MFR BLD HPLC: 7.3

## 2024-10-25 ENCOUNTER — OFFICE (OUTPATIENT)
Dept: URBAN - METROPOLITAN AREA CLINIC 104 | Facility: CLINIC | Age: 74
Setting detail: OPHTHALMOLOGY
End: 2024-10-25
Payer: MEDICARE

## 2024-10-25 ENCOUNTER — RX ONLY (RX ONLY)
Age: 74
End: 2024-10-25

## 2024-10-25 DIAGNOSIS — H26.493: ICD-10-CM

## 2024-10-25 DIAGNOSIS — H01.004: ICD-10-CM

## 2024-10-25 DIAGNOSIS — Z96.1: ICD-10-CM

## 2024-10-25 DIAGNOSIS — H01.005: ICD-10-CM

## 2024-10-25 DIAGNOSIS — E11.9: ICD-10-CM

## 2024-10-25 DIAGNOSIS — Z79.84: ICD-10-CM

## 2024-10-25 DIAGNOSIS — H11.823: ICD-10-CM

## 2024-10-25 DIAGNOSIS — H01.002: ICD-10-CM

## 2024-10-25 DIAGNOSIS — H18.519: ICD-10-CM

## 2024-10-25 DIAGNOSIS — H43.813: ICD-10-CM

## 2024-10-25 DIAGNOSIS — H01.001: ICD-10-CM

## 2024-10-25 DIAGNOSIS — H16.223: ICD-10-CM

## 2024-10-25 PROCEDURE — 92014 COMPRE OPH EXAM EST PT 1/>: CPT | Performed by: OPHTHALMOLOGY

## 2024-10-25 PROCEDURE — 92286 ANT SGM IMG I&R SPECLR MIC: CPT | Performed by: OPHTHALMOLOGY

## 2024-10-25 ASSESSMENT — VISUAL ACUITY
OD_BCVA: 20/30
OS_BCVA: 20/30

## 2024-10-25 ASSESSMENT — CONFRONTATIONAL VISUAL FIELD TEST (CVF)
OS_FINDINGS: FULL
OD_FINDINGS: FULL

## 2024-10-25 ASSESSMENT — REFRACTION_CURRENTRX
OS_ADD: +2.00
OS_SPHERE: +0.75
OD_CYLINDER: -0.25
OS_AXIS: 085
OD_AXIS: 111
OS_OVR_VA: 20/
OD_ADD: +2.00
OS_CYLINDER: -1.00
OD_OVR_VA: 20/
OD_SPHERE: +1.00

## 2024-10-25 ASSESSMENT — REFRACTION_MANIFEST
OS_AXIS: 085
OS_VA1: 20/30
OU_VA: 20/30+2
OD_CYLINDER: -0.25
OD_VA1: 20/30
OD_SPHERE: +1.00
OD_AXIS: 110
OS_CYLINDER: -0.75
OS_SPHERE: +0.75

## 2024-10-25 ASSESSMENT — CORNEAL DYSTROPHY - POSTERIOR
OD_POSTERIORDYSTROPHY: 3+ GUTTATA
OS_POSTERIORDYSTROPHY: 3+ GUTTATA

## 2024-10-25 ASSESSMENT — LID EXAM ASSESSMENTS
OS_BLEPHARITIS: LLL LUL 2+
OD_BLEPHARITIS: RLL RUL 2+

## 2024-10-25 ASSESSMENT — TEAR BREAK UP TIME (TBUT)
OS_TBUT: 2+
OD_TBUT: 2+

## 2024-10-25 ASSESSMENT — KERATOMETRY
OD_K1POWER_DIOPTERS: 45.00
OS_AXISANGLE_DEGREES: 004
OD_AXISANGLE_DEGREES: 023
OS_K1POWER_DIOPTERS: 44.64
OS_K2POWER_DIOPTERS: 45.92
OD_K2POWER_DIOPTERS: 45.79

## 2024-10-25 ASSESSMENT — REFRACTION_AUTOREFRACTION
OD_CYLINDER: -1.50
OS_CYLINDER: -2.00
OS_SPHERE: +1.00
OS_AXIS: 085
OD_SPHERE: +1.25
OD_AXIS: 115

## 2024-10-25 ASSESSMENT — TONOMETRY
OS_IOP_MMHG: 13
OD_IOP_MMHG: 13
